# Patient Record
Sex: MALE | Race: WHITE | Employment: OTHER | URBAN - METROPOLITAN AREA
[De-identification: names, ages, dates, MRNs, and addresses within clinical notes are randomized per-mention and may not be internally consistent; named-entity substitution may affect disease eponyms.]

---

## 2019-04-25 ENCOUNTER — HOSPITAL ENCOUNTER (OUTPATIENT)
Age: 54
Discharge: HOME | End: 2019-04-25
Payer: COMMERCIAL

## 2019-04-25 ENCOUNTER — OFFICE VISIT (OUTPATIENT)
Dept: GASTROENTEROLOGY | Age: 54
End: 2019-04-25
Payer: COMMERCIAL

## 2019-04-25 VITALS
SYSTOLIC BLOOD PRESSURE: 136 MMHG | HEIGHT: 64 IN | WEIGHT: 149 LBS | DIASTOLIC BLOOD PRESSURE: 84 MMHG | BODY MASS INDEX: 25.44 KG/M2

## 2019-04-25 DIAGNOSIS — R10.13 EPIGASTRIC PAIN: Primary | ICD-10-CM

## 2019-04-25 DIAGNOSIS — K92.1 BLOOD IN STOOL: ICD-10-CM

## 2019-04-25 DIAGNOSIS — Z80.0 FAMILY HISTORY OF GI TRACT CANCER: ICD-10-CM

## 2019-04-25 DIAGNOSIS — R13.19 ESOPHAGEAL DYSPHAGIA: ICD-10-CM

## 2019-04-25 DIAGNOSIS — R10.13 EPIGASTRIC PAIN: ICD-10-CM

## 2019-04-25 PROCEDURE — 3017F COLORECTAL CA SCREEN DOC REV: CPT | Performed by: INTERNAL MEDICINE

## 2019-04-25 PROCEDURE — 36415 COLL VENOUS BLD VENIPUNCTURE: CPT

## 2019-04-25 PROCEDURE — G8419 CALC BMI OUT NRM PARAM NOF/U: HCPCS | Performed by: INTERNAL MEDICINE

## 2019-04-25 PROCEDURE — 83690 ASSAY OF LIPASE: CPT

## 2019-04-25 PROCEDURE — 99204 OFFICE O/P NEW MOD 45 MIN: CPT | Performed by: INTERNAL MEDICINE

## 2019-04-25 PROCEDURE — 1036F TOBACCO NON-USER: CPT | Performed by: INTERNAL MEDICINE

## 2019-04-25 PROCEDURE — 85025 COMPLETE CBC W/AUTO DIFF WBC: CPT

## 2019-04-25 PROCEDURE — 80053 COMPREHEN METABOLIC PANEL: CPT

## 2019-04-25 PROCEDURE — G8427 DOCREV CUR MEDS BY ELIG CLIN: HCPCS | Performed by: INTERNAL MEDICINE

## 2019-04-25 RX ORDER — LOSARTAN POTASSIUM 25 MG/1
TABLET ORAL
Refills: 5 | COMMUNITY
Start: 2019-03-11

## 2019-04-25 RX ORDER — MONTELUKAST SODIUM 10 MG/1
10 TABLET ORAL NIGHTLY
COMMUNITY

## 2019-04-25 RX ORDER — TESTOSTERONE CYPIONATE 200 MG/ML
INJECTION INTRAMUSCULAR
Refills: 5 | COMMUNITY
Start: 2019-03-11

## 2019-04-25 NOTE — LETTER
[address] [facility]  [facility] [address]                                       p [phone]  f [phone]  Dyana Uribe MD                        [address] 19    3:09 PM    Facility: [facility] ENDO                                                             Procedure Date & Time: 19  @9:00am     Pt arrival: 8:00am                                                                                    Patient Name:  Shaq Shaw     :  1965 PCP:  [address] [place] [address] Ph:    [phone] (home)           SSN:                                         PROCEDURE: EGD                                                                 DIAGNOSIS: Epigastric pain                                              R10.13                        Esophageal dysphagia                                 R13.10     Anesthesia: _None_  Time Needed: 20 minutes  Pt Position:  lateral, right side up         Outpatient _X_                                    ____PREP: none                            _____Cardiac Clearance by; ___________     Medications to be stopped 5 days before procedure: _________  Additional / Special Orders:                                                                                                                                                                                                     Shaq Shaw    1965                                                    Endoscopy Order   IN ACCORDANCE WITH OUR FORMULARY SYSTEM, A GENERIC EQUIVALENT DRUG MAY BE DISPNSED AND ADMINISTERED UNLESS D. A. W. IS WRITTEN WITH THE MEDICATION ORDER.   DATE HOUR PHYSICIAN:  RECORD DATE, HOUR AND SIGN EACH ENTRY   19 9:00am  1)  Admit for:   []Colonoscopy  [x]EGD     []Anesthesia/MAC        []ERCP     []Upper EUS     []Lower EUS                             2)  Diagnosis: Epigastric pain & esophageal dysphagia      3)  Establish IV access Solution:  []0.9 Normal Saline   [x]Lactated Ringers    []Other:                            Rate:   [x]KVO      []Other:     4)  Check blood sugar on all diabetic patients       Urine Pregnancy test on all menstruating females     5)  Labs:       []PT/INR         []Platelet       []Other:____________     6)  Procedure Medications:     []Fentanyl ______micrograms IV   []Demerol ________mg IV     []Versed _______mg IV                          []Other:     7) [x]Dinemap      [x]Pulse Oximetry    [x]Cardiac Monitor     8)  Post Procedure:       [x]Titrate O2 to keep O2 Sat.  > 90%     [x]Discharge instructions per                                                                     Endoscopy Protocol     [x]Discharge home when awake and vital signs stable                                                                          Signature:         Date:4/25/19               Time: 3:09 PM   PHYSICIANS ORDERS

## 2019-04-25 NOTE — PROGRESS NOTES
[address] [address] [facility] ,  [address] [facility]  Phone: [phone] [address] COMPLAINT     Chief Complaint   Patient presents with   [facility].     NP- abd pain, fm hx stomach cancer       HPI     Thank you Lolis Fang for asking me to see Irvin Grayson in consultation. He is a  [2] 47 y.o. . male seen independently who presents with the following GI complaints:  . Kandice Dawn  Complains of abdominal burning of a months duration. Indicates his mother had intestinal cancer. Saw some dark black stool at onset but none recently. No heartburn but has had some recent dysphagia. No cough, chest pain, globus, regurgitation, nausea, or vomiting. No asa or nsaid use. Had colonoscopy last year in [place] which he states was normal.    HPI elements: location, severity, timing, modifying factors, quality, duration, context and associated signs/symptoms. Last Encounter Reviewed:  Pertinent PMH, FH, SH is reviewed below. Last EGD: none  Last Colonoscopy: 2018 normal    Review of available records reveals: Wt Readings from Last 50 Encounters:   04/25/19 149 lb (67.6 kg)       No components found for: HGBA1C  BP Readings from Last 3 Encounters:   04/25/19 136/84     Health Maintenance   Topic Date Due    Potassium monitoring  1965    Creatinine monitoring  1965    HIV screen  05/18/1981    DTaP/Tdap/Td vaccine (1 - Tdap) 05/18/1985    Lipid screen  05/18/2006    Diabetes screen  05/18/2006    Shingles Vaccine (1 of 2) 05/18/2016    Colon cancer screen colonoscopy  05/18/2016    Flu vaccine (Season Ended) 09/01/2019    Pneumococcal 0-64 years Vaccine  Aged Out       No components found for: [facility]   History reviewed. No pertinent past medical history. FAMILY HISTORY   History reviewed. No pertinent family history.   SOCIAL HISTORY     Social History     Socioeconomic History    Marital status:  Spouse name: Not on file    Number of children: Not on file    Years of education: Not on file    Highest education level: Not on file   Occupational History    Not on file   Social Needs    Financial resource strain: Not on file    Food insecurity:     Worry: Not on file     Inability: Not on file    Transportation needs:     Medical: Not on file     Non-medical: Not on file   Tobacco Use    Smoking status: Never Smoker    Smokeless tobacco: Never Used   Substance and Sexual Activity    Alcohol use: Not on file    Drug use: Not on file    Sexual activity: Not on file   Lifestyle    Physical activity:     Days per week: Not on file     Minutes per session: Not on file    Stress: Not on file   Relationships    Social connections:     Talks on phone: Not on file     Gets together: Not on file     Attends Zoroastrian service: Not on file     Active member of club or organization: Not on file     Attends meetings of clubs or organizations: Not on file     Relationship status: Not on file    Intimate partner violence:     Fear of current or ex partner: Not on file     Emotionally abused: Not on file     Physically abused: Not on file     Forced sexual activity: Not on file   Other Topics Concern    Not on file   Social History Narrative    Not on file     SURGICAL HISTORY   History reviewed. No pertinent surgical history.   CURRENT MEDICATIONS   (This list may include medications prescribed during this encounter as epic can not insert only the list prior to this encounter.)  Current Outpatient Rx   Medication Sig Dispense Refill    losartan (COZAAR) 25 MG tablet TAKE 1 TABLET BY MOUTH ONCE A DAY  5    montelukast (SINGULAIR) 10 MG tablet Take 10 mg by mouth nightly      testosterone cypionate (DEPOTESTOTERONE CYPIONATE) 200 MG/ML injection INJECT  5ML 1ML EVERY 2 WEEKS  5     ALLERGIES     Allergies   Allergen Reactions    Pcn [Penicillins]      IMMUNIZATIONS     There is no immunization history on file for this patient. REVIEW OF SYSTEMS   See HPI for further details and pertinent postiives. Negative for the following:  Constitutional: Negative for weight change. Negative for appetite change and fatigue. HENT: Negative for nosebleeds, sore throat, mouth sores, and voice change. Respiratory: Negative for cough, choking and chest tightness. Cardiovascular: Negative for chest pain   Gastrointestinal: See HPI  Musculoskeletal: Negative for arthralgias. Skin: Negative for pallor. Neurological: Negative for weakness and light-headedness. Hematological: Negative for adenopathy. Does not bruise/bleed easily. Psychiatric/Behavioral: Negative for suicidal ideas. PHYSICAL EXAM   VITAL SIGNS: /84 (Site: Left Upper Arm)   Ht 5' 4\" (1.626 m)   Wt 149 lb (67.6 kg)   BMI 25.58 kg/m²   Wt Readings from Last 3 Encounters:   04/25/19 149 lb (67.6 kg)     Constitutional: Well developed, Well nourished, No acute distress, Non-toxic appearance. HENT: Normocephalic, Atraumatic, Bilateral external ears normal, Oropharynx moist, No oral exudates, Nose normal.   Eyes: Conjunctiva normal, No discharge. Neck: Normal range of motion, No tenderness, Supple, No stridor. Lymphatic: No cervical, subclavian, or axillary lymphadenopathy. Cardiovascular: Normal heart rate, Normal rhythm, No murmurs, No rubs, No gallops. Thorax & Lungs: Normal breath sounds, No respiratory distress, No wheezing, No chest tenderness. No gynecomastia. Abdomen: scars consistent with stated surgeries, no hernias, no HSM, soft NTND   Rectal:  Deferred. Skin: Warm, Dry, No erythema, No rash. No bruising. No spider hemangiomas. Back: No tenderness, No CVA tenderness. Lower Extremities: Intact distal pulses, No edema, No tenderness, No cyanosis, No clubbing. Neurologic: Alert & oriented x 3, Normal motor function, Normal sensory function, No focal deficits noted. No asterixis.   RADIOLOGY/PROCEDURES       FINAL IMPRESSION Orders Placed This Encounter   Procedures    CBC Auto Differential     Standing Status:   Future     Number of Occurrences:   1     Standing Expiration Date:   4/25/2020    Comprehensive Metabolic Panel     Standing Status:   Future     Number of Occurrences:   1     Standing Expiration Date:   4/25/2020    Lipase     Standing Status:   Future     Number of Occurrences:   1     Standing Expiration Date:   4/25/2020    EGD     Scheduling Instructions:      Please provide prep of choice instructions and prescription. General guidelines for holding blood thinners/anticoagulants around endoscopic procedure are but patients are encouraged to check with their prescribing physician. The patient may hold Plavix, Effient, Brilinta 5 days prior to the procedure unless:       A drug eluting stent has been placed within past 12 months. A nondrug eluting stent has been placed within past 1 month. Coumadin may be held 4 days prior to the procedure unless:        Mechanical mitral valve replacement (requires heparin bridge while Coumadin held and is managed by pharmacy)      Pradaxa, Xarelto, Eliquis may be held 2-3 days prior to procedure. According to pharmacokinetics of the drug, package insert, cardiology practice patterns, and T1/2 of theses drugs (12 hrs), Eliquis and Xarelto are held 48hrs prior to any procedure, including major surgical procedures w/o       increased bleeding.  That is usually the standard of care, as coagulation would/should be normalized at 48hrs. Every attempt should be made to maintain ASA 81mg per day throughout the lory-operative period in patients with diagnosis of ASHD. These recommendations may need to be modified by the provider/ based on risk /benefit analysis of the procedure and the patients history.             If anticoagulation can not be held because recent cardiac stent, elective endoscopic procedures should be delayed until

## 2019-04-25 NOTE — PATIENT INSTRUCTIONS
[facility]    [address] [facility] ,  [address] [facility]  Phone: [phone]  [facility],  [address] [place] [address], [address] [place] [address]  Phone: [phone]    Sedation  Three types of sedation are used for endoscopy and colonoscopy. The standard and most common is called conscious sedation. This is administered by the gastroenterologist and is part of the standard procedure. Common medications used for this are IV forms of a benzodiazepine (most commonly Versed) and a narcotic (most commonly fentanyl). Benadryl and nausea medicines may also be used. The effect of this is to make you comfortable. Most people will actually have amnesia with this and not recall the procedure. Some individuals will have other types of anesthesia provided by an anesthesiologist or nurse anesthetist.  The reason for this is a history of poor sedation, medication use that makes one more resistant to conscious sedation, a medical condition for which conscious sedation is contraindicated or other medical unstable conditions. This usually involves a separate fee from anesthesia. The most common of these is propofol (diprivan sedation) which is a deeper sedative the conscious sedation. In some instances, general anesthesia with intubation (breathing tube) is required. ENDOSCOPY OVERVIEW  An upper endoscopy, often referred to as endoscopy, EGD, or wethstgv-zyyqbw-kuyqomuhrsis, is a procedure that allows a physician to directly examine the upper part of the gastrointestinal (GI) tract, which includes the esophagus (swallowing tube), the stomach, and the duodenum (the first section of the small intestine)  The physician who performs the procedures, known as an endoscopist, has special training in using an endoscope to examine the upper GI system, looking for inflammation (redness, irritation), bleeding, ulcers, or tumors.     REASONS FOR UPPER ENDOSCOPY  The most common reasons for upper endoscopy include:  Unexplained discomfort in the upper abdomen   GERD or gastroesophageal reflux disease, (often called heartburn)   Persistent nausea and vomiting   Upper GI bleeding (vomiting blood or blood found in the stool that originated from the upper part of the gastrointestinal tract). Bleeding can be treated during the endoscopy. Difficulty swallowing; food/liquids getting stuck in the esophagus during swallowing. This may be caused by a narrowing (stricture) or tumor. The stricture may be dilated with special balloons or dilation tubes during the endoscopy. Abnormal or unclear findings on an upper GI x-ray, CT scan or MRI. Removal of a foreign body (a swallowed object). To check healing or progress on previously found polyps (growths), tumors, or ulcers. ENDOSCOPY PREPARATION  You will be given specific instructions regarding how to prepare for the examination before the procedure. These instructions are designed to maximize your safety during and after the examination and to minimize possible complications. It is important to read the instructions ahead of time and follow them carefully. Do not hesitate to call the physician's office or the endoscopy unit if there are questions. Nothing to eat after midnight the day before the test. You may be asked not to eat or drink anything for up to eight hours before the test. It is important for your stomach to be empty to allow the endoscopist to visualize the entire area and to decrease the possibility of food or fluid being vomited into the lungs while under sedation (called aspiration). You may be asked to adjust the dose of your medications or to stop specific medications (such as aspirin-like drugs) temporarily before the examination. You should discuss your medications with your physician before your appointment for the endoscopy. You should arrange for a friend or family member to escort you home after the examination. Although you will be awake by the time you are discharged, the medications used for sedation cause temporary changes in the reflexes and judgment and interfere with your ability to drive or make decisions (similar to the effects of alcohol). WHAT TO EXPECT DURING ENDOSCOPY  Prior to the endoscopy, the staff will review your medical and surgical history, including current medications. A physician will explain the procedure and ask you to sign a consent. Before signing the consent, you should understand all the benefits and risks of the procedure, and should have all of your questions answered. An intravenous line (a needle inserted into a vein in the hand or arm) will be started to deliver medications. You will be given a combination of a sedative (to help you relax), and a narcotic (to prevent discomfort). Although most patients are sedated for the examination, many tolerate the procedure well without any medication. Your vital signs (blood pressure, heart rate, and blood oxygen level) will be monitored before, during, and after the examination. The monitoring is not painful. Oxygen is often given during the procedure through a small tube that sits under the nose and is fitted around the ears. For safety reasons, dentures should be removed before the procedure. THE ENDOSCOPY PROCEDURE  The procedure typically takes between 10 and 20 minutes to complete. The endoscopy is performed while you lie on your left side. Sometimes the physician will give a medication to numb the throat (either a gargle or a spray). A plastic mouth guard is placed between the teeth to prevent damage to the teeth and scope. The endoscope (also called a gastroscope) is a flexible tube that is about the size of a finger. The scope has a lens and a light source that allows the endoscopist to look into the scope to see the inner lining of the upper gastrointestinal tract, or to view it on a TV monitor.  Most people have no difficulty swallowing the flexible gastroscope as a result of the sedating medications. Many people sleep during the test; others are very relaxed and generally not aware of the examination. An alternative procedure called transnasal endoscopy may be available in some facilities. This involves passing a very thin scope (about the size of a drinking straw) through the nose. You are not sedated but a medication is applied to the nose to prevent discomfort. A full examination can be performed with this instrument. The endoscopist may take tissue samples called biopsies (not painful), or perform specific treatments (such as dilation, removal of polyps, treatment of bleeding), depending upon what is found during the examination. Air is introduced through the scope to open the esophagus, stomach, and intestine, allowing the scope to be passed through these structures and improving the endoscopist's ability to see all of the structures. You may experience a mild discomfort as air is pushed into the intestinal tract. This is not harmful and belching may relieve the sensation. The endoscope does not interfere with breathing. Taking slow, deep breaths during the procedure may help you to relax. ENDOSCOPY RECOVERY  After the endoscopy, you will be observed for one to two hours while the sedative medication wears off. The medicines cause most people to temporarily feel tired or have difficulty concentrating and you should not drive or return to work after the procedure. The most common discomfort after the examination is a feeling of bloating as a result of the air introduced during the examination. This usually resolves quickly. Some patients also have a mild sore throat. Most patients are able to eat shortly after the examination. ENDOSCOPY COMPLICATIONS  Upper endoscopy is a safe procedure and complications are uncommon.  The following is a list of possible complications:  Aspiration (inhaling) of food or fluids into the lungs, the risk of which can be minimized by not eating or drinking for the recommended period of time before the examination. The endoscope can cause a tear or hole in the tissue being examined. This is a serious complication but fortunately occurs only rarely. Bleeding can occur from biopsies or the removal of polyps, although it is usually minimal and stops quickly on its own or can be easily controlled. Reactions to the sedative medications are possible; the endoscopy team (doctors and nurses) will ask about previous medication allergies or reactions and about health problems such as heart, lung, kidney, or liver disease. Providing this information to the team ensures a safer examination. The medications may produce irritation in the vein at the site of the intravenous line. If redness, swelling, or discomfort occurs, your should call your endoscopist or primary care provider, or the number given by the nurse at discharge. The following signs and symptoms should be reported immediately:  Severe abdominal pain (more than gas cramps)   A firm, distended abdomen   Vomiting   Any temperature elevation   Difficulty swallowing or severe throat pain   A crunching feeling under the skin of the neck    AFTER UPPER ENDOSCOPY  Most patients tolerate endoscopy very well and feel fine afterwards. Some fatigue is common after the examination, and you should plan to take it easy and relax the rest of the day. The endoscopist can describe the result of their examination before you leave the endoscopy unit. If biopsies have been taken or polyps removed, you should call for results within one to two weeks. WHERE TO GET MORE INFORMATION  Your healthcare provider is the best source of information for questions and concerns related to your medical problem. The following organizations also provide reliable health information. [employer] (www.nlm.nih.gov/medlineplus/healthtopics. html)  The [address] of Gastrointestinal Endoscopy: (www.askasge. org)  [employer] of Diabetes and Digestive and Kidney Diseases (http://digestive. niddk.nih.gov/ddiseases/pubs/upperendoscopy/index. htm)

## 2019-04-26 ENCOUNTER — TELEPHONE (OUTPATIENT)
Dept: GASTROENTEROLOGY | Age: 54
End: 2019-04-26

## 2019-04-26 DIAGNOSIS — R73.09 ELEVATED GLUCOSE LEVEL: Primary | ICD-10-CM

## 2019-04-26 LAB
A/G RATIO: 1.6 (ref 1.1–2.2)
ALBUMIN SERPL-MCNC: 4.2 G/DL (ref 3.4–5)
ALP BLD-CCNC: 97 U/L (ref 40–129)
ALT SERPL-CCNC: 25 U/L (ref 10–40)
ANION GAP SERPL CALCULATED.3IONS-SCNC: 13 MMOL/L (ref 3–16)
AST SERPL-CCNC: 19 U/L (ref 15–37)
BASOPHILS ABSOLUTE: 0.1 K/UL (ref 0–0.2)
BASOPHILS RELATIVE PERCENT: 1.1 %
BILIRUB SERPL-MCNC: 0.3 MG/DL (ref 0–1)
BUN BLDV-MCNC: 15 MG/DL (ref 7–20)
CALCIUM SERPL-MCNC: 9.1 MG/DL (ref 8.3–10.6)
CHLORIDE BLD-SCNC: 104 MMOL/L (ref 99–110)
CO2: 25 MMOL/L (ref 21–32)
CREAT SERPL-MCNC: 0.7 MG/DL (ref 0.9–1.3)
EOSINOPHILS ABSOLUTE: 0.3 K/UL (ref 0–0.6)
EOSINOPHILS RELATIVE PERCENT: 3.3 %
GFR AFRICAN AMERICAN: >60
GFR NON-AFRICAN AMERICAN: >60
GLOBULIN: 2.6 G/DL
GLUCOSE BLD-MCNC: 142 MG/DL (ref 70–99)
HCT VFR BLD CALC: 43.7 % (ref 40.5–52.5)
HEMOGLOBIN: 15.2 G/DL (ref 13.5–17.5)
LIPASE: 42 U/L (ref 13–60)
LYMPHOCYTES ABSOLUTE: 1.5 K/UL (ref 1–5.1)
LYMPHOCYTES RELATIVE PERCENT: 20.4 %
MCH RBC QN AUTO: 29.4 PG (ref 26–34)
MCHC RBC AUTO-ENTMCNC: 34.8 G/DL (ref 31–36)
MCV RBC AUTO: 84.5 FL (ref 80–100)
MONOCYTES ABSOLUTE: 0.5 K/UL (ref 0–1.3)
MONOCYTES RELATIVE PERCENT: 7 %
NEUTROPHILS ABSOLUTE: 5.2 K/UL (ref 1.7–7.7)
NEUTROPHILS RELATIVE PERCENT: 68.2 %
PDW BLD-RTO: 13.9 % (ref 12.4–15.4)
PLATELET # BLD: 191 K/UL (ref 135–450)
PMV BLD AUTO: 10.8 FL (ref 5–10.5)
POTASSIUM SERPL-SCNC: 3.8 MMOL/L (ref 3.5–5.1)
RBC # BLD: 5.17 M/UL (ref 4.2–5.9)
SODIUM BLD-SCNC: 142 MMOL/L (ref 136–145)
TOTAL PROTEIN: 6.8 G/DL (ref 6.4–8.2)
WBC # BLD: 7.6 K/UL (ref 4–11)

## 2019-04-26 NOTE — RESULT ENCOUNTER NOTE
Please call patient with results. Remind about ability to get results, make appointments, and communicate with us through [facility] since not signed up. Will need to follow up with a fasting sugar at some point to make sure he does not have diabetes.

## 2019-04-26 NOTE — TELEPHONE ENCOUNTER
----- Message from Catherine Lanier MD sent at 4/26/2019 11:35 AM EDT -----  Please call patient with results. Remind about ability to get results, make appointments, and communicate with us through [employer] since not signed up. Will need to follow up with a fasting sugar at some point to make sure he does not have diabetes.

## 2019-05-22 ENCOUNTER — TELEPHONE (OUTPATIENT)
Dept: GASTROENTEROLOGY | Age: 54
End: 2019-05-22

## 2019-05-23 ENCOUNTER — HOSPITAL ENCOUNTER (OUTPATIENT)
Age: 54
Setting detail: OUTPATIENT SURGERY
Discharge: HOME | End: 2019-05-23
Attending: INTERNAL MEDICINE | Admitting: INTERNAL MEDICINE
Payer: COMMERCIAL

## 2019-05-23 ENCOUNTER — HOSPITAL ENCOUNTER (OUTPATIENT)
Age: 54
Discharge: HOME | End: 2019-05-23
Payer: COMMERCIAL

## 2019-05-23 VITALS
SYSTOLIC BLOOD PRESSURE: 137 MMHG | TEMPERATURE: 96.7 F | WEIGHT: 145 LBS | OXYGEN SATURATION: 96 % | RESPIRATION RATE: 16 BRPM | HEART RATE: 63 BPM | BODY MASS INDEX: 24.16 KG/M2 | HEIGHT: 65 IN | DIASTOLIC BLOOD PRESSURE: 89 MMHG

## 2019-05-23 DIAGNOSIS — R73.09 ELEVATED GLUCOSE LEVEL: ICD-10-CM

## 2019-05-23 LAB
A/G RATIO: 1.8 (ref 1.1–2.2)
ALBUMIN SERPL-MCNC: 4.5 G/DL (ref 3.4–5)
ALP BLD-CCNC: 109 U/L (ref 40–129)
ALT SERPL-CCNC: 20 U/L (ref 10–40)
ANION GAP SERPL CALCULATED.3IONS-SCNC: 13 MMOL/L (ref 3–16)
AST SERPL-CCNC: 16 U/L (ref 15–37)
BILIRUB SERPL-MCNC: 0.6 MG/DL (ref 0–1)
BUN BLDV-MCNC: 12 MG/DL (ref 7–20)
CALCIUM SERPL-MCNC: 9.2 MG/DL (ref 8.3–10.6)
CHLORIDE BLD-SCNC: 103 MMOL/L (ref 99–110)
CO2: 24 MMOL/L (ref 21–32)
CREAT SERPL-MCNC: 0.9 MG/DL (ref 0.9–1.3)
GFR AFRICAN AMERICAN: >60
GFR NON-AFRICAN AMERICAN: >60
GLOBULIN: 2.5 G/DL
GLUCOSE BLD-MCNC: 109 MG/DL (ref 70–99)
POTASSIUM SERPL-SCNC: 4.1 MMOL/L (ref 3.5–5.1)
SODIUM BLD-SCNC: 140 MMOL/L (ref 136–145)
TOTAL PROTEIN: 7 G/DL (ref 6.4–8.2)

## 2019-05-23 PROCEDURE — 99152 MOD SED SAME PHYS/QHP 5/>YRS: CPT | Performed by: INTERNAL MEDICINE

## 2019-05-23 PROCEDURE — 6360000002 HC RX W HCPCS: Performed by: INTERNAL MEDICINE

## 2019-05-23 PROCEDURE — 80053 COMPREHEN METABOLIC PANEL: CPT

## 2019-05-23 PROCEDURE — 7100000011 HC PHASE II RECOVERY - ADDTL 15 MIN: Performed by: INTERNAL MEDICINE

## 2019-05-23 PROCEDURE — 7100000010 HC PHASE II RECOVERY - FIRST 15 MIN: Performed by: INTERNAL MEDICINE

## 2019-05-23 PROCEDURE — 3609012400 HC EGD TRANSORAL BIOPSY SINGLE/MULTIPLE: Performed by: INTERNAL MEDICINE

## 2019-05-23 PROCEDURE — 43239 EGD BIOPSY SINGLE/MULTIPLE: CPT | Performed by: INTERNAL MEDICINE

## 2019-05-23 PROCEDURE — 88305 TISSUE EXAM BY PATHOLOGIST: CPT

## 2019-05-23 PROCEDURE — 36415 COLL VENOUS BLD VENIPUNCTURE: CPT

## 2019-05-23 PROCEDURE — 3609015300 HC ESOPHAGEAL DILATION MALONEY: Performed by: INTERNAL MEDICINE

## 2019-05-23 PROCEDURE — 2709999900 HC NON-CHARGEABLE SUPPLY: Performed by: INTERNAL MEDICINE

## 2019-05-23 PROCEDURE — 43450 DILATE ESOPHAGUS 1/MULT PASS: CPT | Performed by: INTERNAL MEDICINE

## 2019-05-23 RX ORDER — MIDAZOLAM HYDROCHLORIDE 5 MG/ML
INJECTION INTRAMUSCULAR; INTRAVENOUS PRN
Status: DISCONTINUED | OUTPATIENT
Start: 2019-05-23 | End: 2019-05-23 | Stop reason: ALTCHOICE

## 2019-05-23 RX ORDER — SODIUM CHLORIDE, SODIUM LACTATE, POTASSIUM CHLORIDE, CALCIUM CHLORIDE 600; 310; 30; 20 MG/100ML; MG/100ML; MG/100ML; MG/100ML
INJECTION, SOLUTION INTRAVENOUS CONTINUOUS
Status: DISCONTINUED | OUTPATIENT
Start: 2019-05-23 | End: 2019-05-23 | Stop reason: HOSPADM

## 2019-05-23 RX ORDER — OMEPRAZOLE 20 MG/1
20 CAPSULE, DELAYED RELEASE ORAL 2 TIMES DAILY
Qty: 30 CAPSULE | Refills: 1 | Status: SHIPPED | OUTPATIENT
Start: 2019-05-23

## 2019-05-23 RX ORDER — FENTANYL CITRATE 50 UG/ML
INJECTION, SOLUTION INTRAMUSCULAR; INTRAVENOUS PRN
Status: DISCONTINUED | OUTPATIENT
Start: 2019-05-23 | End: 2019-05-23 | Stop reason: ALTCHOICE

## 2019-05-23 ASSESSMENT — PAIN - FUNCTIONAL ASSESSMENT: PAIN_FUNCTIONAL_ASSESSMENT: 0-10

## 2019-05-23 NOTE — H&P
[address] [address]     [address] [place] [address] ,  [facility], [facility]  Phone: [phone]     CHIEF COMPLAINT           Chief Complaint   Patient presents with   [address] [place] [address]       NP- abd pain, fm hx stomach cancer         HPI      Thank you Verna Mccarty for asking me to see Era Gan in consultation. He is a  [2] 47 y.o. . male seen independently who presents with the following GI complaints:  . Kandice Dawn  Complains of abdominal burning of a months duration. Indicates his mother had intestinal cancer. Saw some dark black stool at onset but none recently. No heartburn but has had some recent dysphagia. No cough, chest pain, globus, regurgitation, nausea, or vomiting. No asa or nsaid use. Had colonoscopy last year in [place] which he states was normal.     HPI elements: location, severity, timing, modifying factors, quality, duration, context and associated signs/symptoms.     Last Encounter Reviewed:  Pertinent PMH, FH, SH is reviewed below. Last EGD: none  Last Colonoscopy: 2018 normal     Review of available records reveals: Wt Readings from Last 50 Encounters:   04/25/19 149 lb (67.6 kg)         No components found for: HGBA1C      BP Readings from Last 3 Encounters:   04/25/19 136/84           Health Maintenance   Topic Date Due    Potassium monitoring  1965    Creatinine monitoring  1965    HIV screen  05/18/1981    DTaP/Tdap/Td vaccine (1 - Tdap) 05/18/1985    Lipid screen  05/18/2006    Diabetes screen  05/18/2006    Shingles Vaccine (1 of 2) 05/18/2016    Colon cancer screen colonoscopy  05/18/2016    Flu vaccine (Season Ended) 09/01/2019    Pneumococcal 0-64 years Vaccine  Aged Out         No components found for: SURGICALPATH      PAST MEDICAL HISTORY   Past Medical History   History reviewed. No pertinent past medical history. FAMILY HISTORY   Family History   History reviewed. No pertinent family history.      SOCIAL HISTORY      Social History               Socioeconomic History    Marital status:        Spouse name: Not on file    Number of children: Not on file    Years of education: Not on file    Highest education level: Not on file   Occupational History    Not on file   Social Needs    Financial resource strain: Not on file    Food insecurity:       Worry: Not on file       Inability: Not on file    Transportation needs:       Medical: Not on file       Non-medical: Not on file   Tobacco Use    Smoking status: Never Smoker    Smokeless tobacco: Never Used   Substance and Sexual Activity    Alcohol use: Not on file    Drug use: Not on file    Sexual activity: Not on file   Lifestyle    Physical activity:       Days per week: Not on file       Minutes per session: Not on file    Stress: Not on file   Relationships    Social connections:       Talks on phone: Not on file       Gets together: Not on file       Attends Confucianist service: Not on file       Active member of club or organization: Not on file       Attends meetings of clubs or organizations: Not on file       Relationship status: Not on file    Intimate partner violence:       Fear of current or ex partner: Not on file       Emotionally abused: Not on file       Physically abused: Not on file       Forced sexual activity: Not on file   Other Topics Concern    Not on file   Social History Narrative    Not on file         SURGICAL HISTORY   Past Surgical History   History reviewed. No pertinent surgical history.      CURRENT MEDICATIONS   (This list may include medications prescribed during this encounter as epic can not insert only the list prior to this encounter.)  Current Outpatient Rx          Current Outpatient Rx   Medication Sig Dispense Refill    losartan (COZAAR) 25 MG tablet TAKE 1 TABLET BY MOUTH ONCE A DAY   5    montelukast (SINGULAIR) 10 MG tablet Take 10 mg by mouth nightly        testosterone cypionate ([facility] CYPIONATE) 200 MG/ML injection INJECT  5ML 1ML EVERY 2 WEEKS   5         ALLERGIES           Allergies   Allergen Reactions    Pcn [Penicillins]        IMMUNIZATIONS      There is no immunization history on file for this patient. REVIEW OF SYSTEMS   See HPI for further details and pertinent postiives. Negative for the following:  Constitutional: Negative for weight change. Negative for appetite change and fatigue. HENT: Negative for nosebleeds, sore throat, mouth sores, and voice change. Respiratory: Negative for cough, choking and chest tightness. Cardiovascular: Negative for chest pain   Gastrointestinal: See HPI  Musculoskeletal: Negative for arthralgias. Skin: Negative for pallor. Neurological: Negative for weakness and light-headedness. Hematological: Negative for adenopathy. Does not bruise/bleed easily. Psychiatric/Behavioral: Negative for suicidal ideas. PHYSICAL EXAM   VITAL SIGNS: /84 (Site: Left Upper Arm)   Ht 5' 4\" (1.626 m)   Wt 149 lb (67.6 kg)   BMI 25.58 kg/m²       Wt Readings from Last 3 Encounters:   04/25/19 149 lb (67.6 kg)      Constitutional: Well developed, Well nourished, No acute distress, Non-toxic appearance. HENT: Normocephalic, Atraumatic, Bilateral external ears normal, Oropharynx moist, No oral exudates, Nose normal.   Eyes: Conjunctiva normal, No discharge. Neck: Normal range of motion, No tenderness, Supple, No stridor. Lymphatic: No cervical, subclavian, or axillary lymphadenopathy. Cardiovascular: Normal heart rate, Normal rhythm, No murmurs, No rubs, No gallops. Thorax & Lungs: Normal breath sounds, No respiratory distress, No wheezing, No chest tenderness. No gynecomastia. Abdomen: scars consistent with stated surgeries, no hernias, no HSM, soft NTND   Rectal:  Deferred. Skin: Warm, Dry, No erythema, No rash. No bruising. No spider hemangiomas. Back: No tenderness, No CVA tenderness.    Lower Extremities: Intact distal pulses, No edema, No These recommendations may need to be modified by the provider/ based on risk /benefit analysis of the procedure and the patients history.                   If anticoagulation can not be held because recent cardiac stent, elective endoscopic procedures should be delayed until they have received the minimum duration of recommended antiplatlet therapy and it can safely be held. Again if unsure, patient should discuss with prescribing physician/service.                   If anticoagulation can not be stopped, endoscopic procedures can still be performed either diagnostically at a somewhat higher risk. Understand that any therapeutic procedure where anything beyond looking is performed, carries higher risks. For this reason without overt bleeding other testing          such as cologuard may be more appropriate.                     High risk endoscopic procedures that require stopping antiplatelet and anticoagulation therapy include polypectomy, biliary or pancreatic sphincterotomy, pneumatic or bougie dilation, PEG placement, therapeutic balloon-assisted enteroscopy, EUS and FNA, tumor ablation by any technique,          cystogastrostomy,and treatment of varices.       Order Specific Question:   Screening or Diagnostic?       Answer:   Rachel Graves was seen today for establish care.     Diagnoses and all orders for this visit:     Epigastric pain  -     CBC Auto Differential; Future  -     Comprehensive Metabolic Panel; Future  -     Lipase; Future  -     EGD     Blood in stool  -     CBC Auto Differential; Future  -     EGD     Family history of GI tract cancer  -     CBC Auto Differential; Future  -     Comprehensive Metabolic Panel; Future  -     EGD     Esophageal dysphagia  -     EGD        ORDERED FUTURE/PENDING TESTS         FOLLOWUP   Return for EGD.         Toni 40 5/23/19 10:05 AM

## 2019-05-23 NOTE — OP NOTE
[place] [address] ,  [address] [facility]  Phone: [phone] [facility] [address], [address] [address], [address] [place] [address]  Phone: [phone]   SUF:[phone]    EGD Procedure Note    Patient: Sandoval Garcia  : 1965    Procedure: Esophagogastroduodenoscopy with biopsy, esophageal dilation    Date:  2019     Endoscopist:  Cale Hernandez MD    Referring Physician:  Alvarado Ball    Preoperative Diagnosis:  EPIGASTRIC PAIN, DYSPHAGIA    Anesthesia: Anesthesia: Moderate Sedation  Sedation: Versed 5 mg IV, fentanyl 100 mcg IV  Start Time: 10:20  Stop Time: 10:30  ASA Class: 2  Mallampati: I (soft palate, uvula, fauces, tonsillar pillars visible)    Indications: This is a 49y.o. year old male who presents today with epigastric pain, esophageal dysphagia. Procedure Details  Informed consent was obtained for the procedure, including conscious sedation. Risks of pancreatitis, infection, perforation, hemorrhage, adverse drug reaction and aspiration were discussed. The patient was placed in the left lateral decubitus position. Based on the pre-procedure assessment, including review of the patient's medical history, medications, allergies, and review of systems, he had been deemed to be an appropriate candidate for conscious sedation; he was therefore sedated with the medications listed above. He was monitored continuously with ECG tracing, pulse oximetry, blood pressure monitoring, and direct observation. A gastroscope was inserted into the mouth and advanced under direct vision to second portion of the duodenum. A careful inspection was made as the gastroscope was withdrawn, including a retroflexed view of the proximal stomach including views of the incisura and cardia; findings and interventions are described below. Appropriate photodocumentation Was Obtained.   If photos taken, they were ordered to be scanned into the medical record. Findings: -normal esophagus, and duodenum  -erosions involving the antrum. Gastric biopsies taken following the 5-biopsy [place] [address] with all specimens placed in the same jar. -The esophagus was dilated with a 54F Marlow with moderate resistance and no heme. Specimens: Was Obtained    Complications:   None; patient tolerated the procedure well. Disposition:   PACU - hemodynamically stable. Estimated Blood loss:  none    Impression:   -See post-procedure diagnoses. Recommendations:  -prilosec prescribed  --Await biopsy result and treat with quadruple therapy for Helicobacter pylori if positive.   If positive need to do urea breath test or stool antigen to confirm eradication 1 month after completing antibiotics and when off ppi for 2 weeks.  -Follow up with me in 2 months        Toni Matt 5/23/19 10:37 AM

## 2019-05-28 NOTE — RESULT ENCOUNTER NOTE
Please call patient with normal results. Remind about ability to get results, make appointments, and communicate with us through [employer] since not signed up.

## 2019-05-28 NOTE — RESULT ENCOUNTER NOTE
Please call patient with results were negative for h pylori.    -prilosec prescribed  -Follow up with me in 2 months

## 2022-06-13 ENCOUNTER — OFFICE VISIT (OUTPATIENT)
Dept: ORTHOPEDIC SURGERY | Age: 57
End: 2022-06-13
Payer: COMMERCIAL

## 2022-06-13 VITALS — BODY MASS INDEX: 24.16 KG/M2 | HEIGHT: 65 IN | WEIGHT: 145 LBS

## 2022-06-13 DIAGNOSIS — M25.562 LEFT KNEE PAIN, UNSPECIFIED CHRONICITY: ICD-10-CM

## 2022-06-13 DIAGNOSIS — M10.9 GOUT, UNSPECIFIED CAUSE, UNSPECIFIED CHRONICITY, UNSPECIFIED SITE: Primary | ICD-10-CM

## 2022-06-13 DIAGNOSIS — M11.20 CHONDROCALCINOSIS: ICD-10-CM

## 2022-06-13 PROCEDURE — 99204 OFFICE O/P NEW MOD 45 MIN: CPT | Performed by: ORTHOPAEDIC SURGERY

## 2022-06-13 PROCEDURE — 20610 DRAIN/INJ JOINT/BURSA W/O US: CPT | Performed by: ORTHOPAEDIC SURGERY

## 2022-06-13 RX ORDER — INDOMETHACIN 25 MG/1
25 CAPSULE ORAL
Qty: 45 CAPSULE | Refills: 0 | Status: SHIPPED | OUTPATIENT
Start: 2022-06-13

## 2022-06-13 NOTE — PROGRESS NOTES
ORTHOPAEDIC SURGERY H&P / CONSULTATION NOTE    Chief complaint:   Chief Complaint   Patient presents with    Knee Pain     left knee pain      History of present illness: The patient is a 65 y.o. male with subjective symptoms of left knee pain. The chief complaint is located at left knee. Duration of symptoms has been for 1 week. The severity of symptoms is rated at 9/10 pain on intake form. Patient denies fevers or chills. He has been on Aleve and has gone to the emergency room on 6/12/2022 given swelling in the knee. He denies trauma. He states has had gout flares several times over his last several years. He states deep throbbing aching pain. He states swelling does cause the knee to be bent and difficult ambulating. He denies any recent infection or viral prodrome. He has not done any therapy and he has not had any injections thus far. He states his primary care doctor when he had seen him previously had recommended potential starting medication however he had not done so for treatment of gout    The patient has tried the below listed items prior to today's consultation for above listed chief complaint.     +   Over-the-counter anti-inflammatories/prescription medication anti-inflammatory. -   Physical therapy / guided home exercise program -     -   Previous corticosteroid injections    Past medical history:    Past Medical History:   Diagnosis Date    Hypertension         Past surgical history:    Past Surgical History:   Procedure Laterality Date    COLONOSCOPY  2017    normal    ESOPHAGEAL DILATATION N/A 5/23/2019    ESOPHAGEAL DILATION BACON performed by Jarrett Springer MD at [place] [address] ENDOSCOPY  5/23/2019    EGD BIOPSY performed by Jarrett Springer MD at [facility] 8:     Allergies   Allergen Reactions    Pcn [Penicillins]          Medications:   Current Outpatient Medications:     indomethacin (INDOCIN) 25 MG capsule, Take 1 capsule by mouth 3 times daily (with meals), Disp: 45 capsule, Rfl: 0    omeprazole (PRILOSEC) 20 MG delayed release capsule, Take 1 capsule by mouth 2 times daily, Disp: 30 capsule, Rfl: 1    losartan (COZAAR) 25 MG tablet, TAKE 1 TABLET BY MOUTH ONCE A DAY, Disp: , Rfl: 5    montelukast (SINGULAIR) 10 MG tablet, Take 10 mg by mouth nightly, Disp: , Rfl:     testosterone cypionate (DEPOTESTOTERONE CYPIONATE) 200 MG/ML injection, INJECT  5ML 1ML EVERY 2 WEEKS, Disp: , Rfl: 5     Social history: Denies IV drug use. Social History     Socioeconomic History    Marital status:      Spouse name: Not on file    Number of children: Not on file    Years of education: Not on file    Highest education level: Not on file   Occupational History    Not on file   Tobacco Use    Smoking status: Never Smoker    Smokeless tobacco: Never Used   Vaping Use    Vaping Use: Never used   Substance and Sexual Activity    Alcohol use: Not Currently    Drug use: Never    Sexual activity: Not on file   Other Topics Concern    Not on file   Social History Narrative    Not on file     Social Determinants of Health     Financial Resource Strain:     Difficulty of Paying Living Expenses: Not on file   Food Insecurity:     Worried About Running Out of Food in the Last Year: Not on file    Maria M of Food in the Last Year: Not on file   Transportation Needs:     Lack of Transportation (Medical): Not on file    Lack of Transportation (Non-Medical):  Not on file   Physical Activity:     Days of Exercise per Week: Not on file    Minutes of Exercise per Session: Not on file   Stress:     Feeling of Stress : Not on file   Social Connections:     Frequency of Communication with Friends and Family: Not on file    Frequency of Social Gatherings with Friends and Family: Not on file    Attends [employer] Services: Not on file    Active Member of Clubs or Organizations: Not on file   Ban Bangura Attends Club or Organization Meetings: Not on file    Marital Status: Not on file   Intimate Partner Violence:     Fear of Current or Ex-Partner: Not on file    Emotionally Abused: Not on file    Physically Abused: Not on file    Sexually Abused: Not on file   Housing Stability:     Unable to Pay for Housing in the Last Year: Not on file    Number of [place] in the Last Year: Not on file    Unstable Housing in the Last Year: Not on file     Tobacco use. Social History     Tobacco Use   Smoking Status Never Smoker   Smokeless Tobacco Never Used     Employment: Noncontributory    Workers compensation claim: Noncontributory    Review of systems: Patient denies any fevers chills chest pain shortness of breath nausea vomiting significant weight loss any change in voiding or bowel movements. Patient denies any significant numbness or tingling at baseline as it relates to this presenting symptom/chief complaint. The patient denies any significant problems with skin or any significant allergies. Physical examination:  Body mass index is 24.13 kg/m². AAOx3, NCAT  EOMI  MMM  RR  Unlabored breathing, no wheezing  Skin intact BUE and BLE, warm and moist  Bilateral lower extremity examination specific to subjective symptoms  Exam Left Lower Extremity: No erythema albeit trace warmth  2-3+ effusion,      5/55/0 limited secondary to swelling active ROM (E/F/Lag), same P assive ROM (E/F/Lag), unable to test anterior Drawer, 1A Lachman, unable to test posterior Drawer,   Stable varus/valgus at 0 and 30?,    trace medial/lateral TTP Joint Line, unable to test Margret,   Skin intact throughout  5/5 IP Q H TA G EHL  SILT DP SP LP MP S S  +2 DP pulse    Diagnostic imaging:  MY READ:  4 views left knee 6/13/2022: Positive chondrocalcinosis lateral greater than medial meniscus. Very slight medial joint space narrowing. Negative fracture    Pertinent lab work: None     Diagnosis Orders   1.  Gout, unspecified cause, unspecified chronicity, unspecified site  indomethacin (INDOCIN) 25 MG capsule    Christie Ramos MD, Rheumatology, [facility]    VA ARTHROCENTESIS ASPIR&/INJ MAJOR JT/BURSA W/O US   2. Chondrocalcinosis  indomethacin (INDOCIN) 25 MG capsule    Christie Ramos MD, Rheumatology, [facility]    VA ARTHROCENTESIS ASPIR&/INJ MAJOR JT/BURSA W/O US   3. Left knee pain, unspecified chronicity  XR KNEE LEFT (MIN 4 VIEWS)    VA ARTHROCENTESIS ASPIR&/INJ MAJOR JT/BURSA W/O US       Assessment and plan: 65 y.o. male with current subjective symptoms and physical exam findings with diagnostic imaging correlating to left knee chondrocalcinosis/crystalline arthropathy.  -Time of 16 minutes was spent coordinating and discussing the clinical findings, reviewing diagnostic imaging as indicated, coordinating care with prior notes review and current clinical encounter documentation as it pertains to the patient's presenting subjective symptoms and diagnoses. -I reviewed with the patient the imaging findings as well as clinical exam and  how it correlates to subjective symptoms.  -I had a pleasant discussion with the patient and his wife who accompanies him. I reviewed with him both that currently his clinical examination correlates to left knee chondrocalcinosis/crystalline arthropathy. I reviewed with him consideration for anti-inflammatory use. I did not advocate for the starting of long-term medication as it can exacerbate acute symptoms. I did recommend a referral to rheumatology which they wish to pursue. This is been placed. -Given the patient's functional limitation in range of motion I offered to do a therapeutic aspiration of the left knee intra-articular space.   Understand the risks and benefits of this he wished to proceed  --The patient was educated to the risks (infection) and benefits of the injection and understanding these risks and benefits, the patient wished to proceed and a verbal consent was obtained. If the patient verbalized the presence of diabetes or rheumatologic condition on chronic immunosuppresseants as a comorbidity upon direct questioning, an additional discussion was had detailing the potential increased risk of infection and potential increase in FSBG and to monitor FSBG and adjust medications as needed.  -After sterile prep of the left knee, a 60 cc of straw-colored fluid without gross flocculence was aspirated from the left knee intra-articular space. The patient tolerated the procedure well and started to have immediate improvement in pain/symptoms. Patient had improved range of motion up to 100 degrees knee flexion. Given the chondrocalcinosis as appreciated on plain radiographs and patient's symptoms and without subjective fevers or warmth and without any clinical findings or concern for infectious etiology, aspirate was not sent for definitive.  -Indomethacin 25 mg p.o. 3 times daily #45 prescribed. I recommended he take this with food each time for symptomatic treatment of crystalline arthropathy  -All questions answered to the patient's satisfaction and the patient expressed understanding and agreement with the above listed treatment plan  -Follow up in as needed  -Thank you for the clinical consultation and allowing me to participate in the patient's care. Electronically signed by Leidy Rice MD on 6/13/22 at 2:33 PM EDT         Leidy Rice MD       Orthopaedic Surgery-Sports Medicine        Disclaimer: This note was dictated with voice recognition software. Though review and correction are routinely performed, please contact the office/medical records for any errors requiring correction.

## 2024-07-16 ENCOUNTER — OFFICE VISIT (OUTPATIENT)
Dept: FAMILY MEDICINE CLINIC | Age: 59
End: 2024-07-16
Payer: COMMERCIAL

## 2024-07-16 VITALS
WEIGHT: 151.4 LBS | OXYGEN SATURATION: 96 % | HEIGHT: 65 IN | DIASTOLIC BLOOD PRESSURE: 80 MMHG | SYSTOLIC BLOOD PRESSURE: 124 MMHG | BODY MASS INDEX: 25.22 KG/M2 | HEART RATE: 79 BPM | RESPIRATION RATE: 16 BRPM | TEMPERATURE: 97.3 F

## 2024-07-16 DIAGNOSIS — C43.9 MALIGNANT MELANOMA, UNSPECIFIED SITE (HCC): ICD-10-CM

## 2024-07-16 DIAGNOSIS — R53.83 OTHER FATIGUE: ICD-10-CM

## 2024-07-16 DIAGNOSIS — Z80.0 FAMILY HISTORY OF MALIGNANT NEOPLASM OF GASTROINTESTINAL TRACT: ICD-10-CM

## 2024-07-16 DIAGNOSIS — I65.23 BILATERAL CAROTID ARTERY STENOSIS: ICD-10-CM

## 2024-07-16 DIAGNOSIS — E55.9 VITAMIN D DEFICIENCY: ICD-10-CM

## 2024-07-16 DIAGNOSIS — N40.0 BENIGN PROSTATIC HYPERPLASIA WITHOUT LOWER URINARY TRACT SYMPTOMS: ICD-10-CM

## 2024-07-16 DIAGNOSIS — Z12.11 SCREENING FOR COLON CANCER: ICD-10-CM

## 2024-07-16 DIAGNOSIS — K21.9 CHRONIC GERD: ICD-10-CM

## 2024-07-16 DIAGNOSIS — E34.9 TESTOSTERONE DEFICIENCY: ICD-10-CM

## 2024-07-16 DIAGNOSIS — I10 ESSENTIAL HYPERTENSION: ICD-10-CM

## 2024-07-16 DIAGNOSIS — Z00.00 ANNUAL PHYSICAL EXAM: Primary | ICD-10-CM

## 2024-07-16 DIAGNOSIS — Z00.00 ENCOUNTER FOR MEDICAL EXAMINATION TO ESTABLISH CARE: ICD-10-CM

## 2024-07-16 DIAGNOSIS — E78.2 MIXED HYPERLIPIDEMIA: ICD-10-CM

## 2024-07-16 PROBLEM — E78.5 HYPERLIPIDEMIA: Status: ACTIVE | Noted: 2020-12-14

## 2024-07-16 PROBLEM — M10.9 GOUTY ARTHROPATHY: Status: ACTIVE | Noted: 2019-11-18

## 2024-07-16 PROBLEM — R79.89 DECREASED TESTOSTERONE LEVEL: Status: ACTIVE | Noted: 2017-10-20

## 2024-07-16 PROCEDURE — 99386 PREV VISIT NEW AGE 40-64: CPT | Performed by: STUDENT IN AN ORGANIZED HEALTH CARE EDUCATION/TRAINING PROGRAM

## 2024-07-16 PROCEDURE — 3079F DIAST BP 80-89 MM HG: CPT | Performed by: STUDENT IN AN ORGANIZED HEALTH CARE EDUCATION/TRAINING PROGRAM

## 2024-07-16 PROCEDURE — 3074F SYST BP LT 130 MM HG: CPT | Performed by: STUDENT IN AN ORGANIZED HEALTH CARE EDUCATION/TRAINING PROGRAM

## 2024-07-16 RX ORDER — ERGOCALCIFEROL 1.25 MG/1
50000 CAPSULE ORAL WEEKLY
COMMUNITY

## 2024-07-16 RX ORDER — LOSARTAN POTASSIUM 50 MG/1
50 TABLET ORAL DAILY
Qty: 90 TABLET | Refills: 0 | Status: SHIPPED | OUTPATIENT
Start: 2024-07-16

## 2024-07-16 RX ORDER — METOPROLOL SUCCINATE 25 MG/1
25 TABLET, EXTENDED RELEASE ORAL DAILY
COMMUNITY
End: 2024-07-16 | Stop reason: SDUPTHER

## 2024-07-16 RX ORDER — TAMSULOSIN HYDROCHLORIDE 0.4 MG/1
0.4 CAPSULE ORAL DAILY
COMMUNITY

## 2024-07-16 RX ORDER — TADALAFIL 5 MG/1
5 TABLET ORAL PRN
COMMUNITY

## 2024-07-16 RX ORDER — METOPROLOL SUCCINATE 25 MG/1
25 TABLET, EXTENDED RELEASE ORAL DAILY
Qty: 90 TABLET | Refills: 0 | Status: SHIPPED | OUTPATIENT
Start: 2024-07-16

## 2024-07-16 RX ORDER — LOSARTAN POTASSIUM 50 MG/1
25 TABLET ORAL DAILY
Qty: 90 TABLET | Refills: 0 | Status: SHIPPED | OUTPATIENT
Start: 2024-07-16 | End: 2024-07-16

## 2024-07-16 RX ORDER — MIRABEGRON 25 MG/1
50 TABLET, FILM COATED, EXTENDED RELEASE ORAL DAILY
COMMUNITY

## 2024-07-16 RX ORDER — SUCRALFATE 1 G/1
1 TABLET ORAL 4 TIMES DAILY
COMMUNITY

## 2024-07-16 RX ORDER — ATORVASTATIN CALCIUM 40 MG/1
40 TABLET, FILM COATED ORAL DAILY
COMMUNITY

## 2024-07-16 NOTE — PROGRESS NOTES
Kandice Dawn  YOB: 1965        Date of Service:  7/16/2024    Chief Complaint:   Kandice Dawn is a 59 y.o. male who presents for    Chief Complaint   Patient presents with    New Patient     Need to advise patient that he will be referred out for testosterone treatments.     HPI:    Patient is a 59 y.o. male  has a past medical history of Carotid artery stenosis, Hyperlipidemia, Hypertension, and Primary central sleep apnea. who presents to \[facility]\"" care.  Patient was previously seen by PCP in [place].    History of blood in stool -  Due for Colonoscopy in Thursday, blood in stool. Dr Ruelas [facility] . 2 weeks no blood.  Endorses bloating and abdominal pain,   History of Melanoma on the back of the neck      Hypertension: Losartan 50 mg daily and metoprolol succinate 25 mg every other day.  Does not check blood pressure daily. Endorses lightheadedness.  History of carotid stenosis was seen by cardiologist in [place]  GERD: Omeprazole 20 mg, and pepcid. Carafate 1 g 4 times daily prescribed in the Er. Seems to help.   Testosterone deficiency: Testosterone level in December, On Testosterone 200 mg/ml, weekly .75 ml. Level 116 . Reports body aches and pain without tesosterone.    DREW - CPAP   Hyperlipidemia: Atorvastatin 40 mg daily  Vitamin D deficiency: 50,000 vitamin D 50,000 units weekly  BPH on mirabegron 50 mg daily and Cialis 5 mg daily . Does not take tamsulosin 0.4 mg daily    Gout History : Was prescribed indomethacin 25 mg capsule/      Pertinent family history: Mother with cancer, diabetes and high blood pressure.    Allergies: Penicillin     Social History  Occupation:   Lives with: wife Riddhi, 2 sons   Smoker: Never   Alcohol use: Never   Illicit Drug use: None   Diet: Sphagetti pork chops chicken , limited fruits and vegetables   Exercise: Walks all day [id number] steps   Sexual activity: yes   Last eye exam: 1 year ago [place]   Last dentist exam: 2-3

## 2024-07-16 NOTE — PATIENT INSTRUCTIONS
- Follow up with Urology for testosterone   - Decrease Meotprolol 12.5mg daily, continue losartan 50 mg daily  - Cardiology   - Labs

## 2024-08-16 ENCOUNTER — TELEPHONE (OUTPATIENT)
Dept: FAMILY MEDICINE CLINIC | Age: 59
End: 2024-08-16

## 2024-08-16 NOTE — TELEPHONE ENCOUNTER
Attempted to call patient regarding missed appointment with Dr. Palacio today. LMTCB to reschedule if needed. No show letter sent.

## 2024-08-29 NOTE — PROGRESS NOTES
accurate and complete to my knowledge.  I agree with the details independently gathered by the clinical support staff and the scribed note accurately describes my personal service to the patient.        Thank you for allowing us to participate in the care of Kandice Dawn. Please call me with any questions [phone].    Elmer Hernandez MD, [facility]   Interventional Cardiologist  [facility]  [phone] [place] Office  [phone] [place] Office  9/5/2024 9:59 AM

## 2024-09-05 ENCOUNTER — TELEPHONE (OUTPATIENT)
Dept: CARDIOLOGY CLINIC | Age: 59
End: 2024-09-05

## 2024-09-05 ENCOUNTER — OFFICE VISIT (OUTPATIENT)
Dept: CARDIOLOGY CLINIC | Age: 59
End: 2024-09-05
Payer: COMMERCIAL

## 2024-09-05 VITALS
WEIGHT: 153 LBS | HEART RATE: 73 BPM | OXYGEN SATURATION: 96 % | BODY MASS INDEX: 25.49 KG/M2 | SYSTOLIC BLOOD PRESSURE: 142 MMHG | HEIGHT: 65 IN | DIASTOLIC BLOOD PRESSURE: 74 MMHG

## 2024-09-05 DIAGNOSIS — R53.83 OTHER FATIGUE: ICD-10-CM

## 2024-09-05 DIAGNOSIS — G47.30 SLEEP APNEA, UNSPECIFIED TYPE: ICD-10-CM

## 2024-09-05 DIAGNOSIS — I65.23 BILATERAL CAROTID ARTERY STENOSIS: ICD-10-CM

## 2024-09-05 DIAGNOSIS — Z76.89 ESTABLISHING CARE WITH NEW DOCTOR, ENCOUNTER FOR: Primary | ICD-10-CM

## 2024-09-05 DIAGNOSIS — I70.90 ATHEROSCLEROSIS: Primary | ICD-10-CM

## 2024-09-05 DIAGNOSIS — I10 ESSENTIAL HYPERTENSION: ICD-10-CM

## 2024-09-05 DIAGNOSIS — I70.90 ATHEROSCLEROSIS: ICD-10-CM

## 2024-09-05 DIAGNOSIS — E78.2 MIXED HYPERLIPIDEMIA: ICD-10-CM

## 2024-09-05 DIAGNOSIS — Z79.899 MEDICATION MANAGEMENT: ICD-10-CM

## 2024-09-05 DIAGNOSIS — R00.2 PALPITATIONS: ICD-10-CM

## 2024-09-05 PROCEDURE — 3077F SYST BP >= 140 MM HG: CPT | Performed by: INTERNAL MEDICINE

## 2024-09-05 PROCEDURE — 99204 OFFICE O/P NEW MOD 45 MIN: CPT | Performed by: INTERNAL MEDICINE

## 2024-09-05 PROCEDURE — 93000 ELECTROCARDIOGRAM COMPLETE: CPT | Performed by: INTERNAL MEDICINE

## 2024-09-05 PROCEDURE — 3078F DIAST BP <80 MM HG: CPT | Performed by: INTERNAL MEDICINE

## 2024-09-05 RX ORDER — ASPIRIN 81 MG/1
81 TABLET ORAL DAILY
Qty: 90 TABLET | Refills: 0
Start: 2024-09-05

## 2024-09-05 NOTE — PATIENT INSTRUCTIONS
Plan:  Order carotid doppler  carotid disease  Order echocardiogram ~ palpitations, fatigue  Order GXT stress myoview ~~ fatigue, atherosclerosis  Order fasting lipids, CMP, CBC, TSH   Continue with CPAP for sleep apnea management recommend continue to follow with sleep clinic  Start Aspirin 81 mg daily ~ carotid disease  Order CT Calcium score ~ carotid disease, hyperlipidemia   Order cardiac event monitor 2 weeks palpitations  Follow up in 1 year or sooner if needed.     Your provider has ordered testing for further evaluation.  An order/prescription has been included in your paper work.   To schedule outpatient testing, contact Central Scheduling by calling [address] [place] [address] ([phone]).     GXT Myoview Stress Test instructions:   -Allow 3-4 hours for the entire test to be complete.  -Nothing to eat or drink after midnight prior to testing. May take prescribed medications in morning with sip of water.  -Do not drink or eat anything containing caffeine 24 hours prior to test. This includes coffee, decaffeinated coffee, chocolate, soda, or tea.     [facility] information cardiac calcium score at [facility] at [address], [place] to call [phone].

## 2024-09-05 NOTE — TELEPHONE ENCOUNTER
Monitor placed by Kaitlynn RN  Monitor company [employer]  Length of monitor 14 days  Monitor ordered by [facility]  Serial number [id number]  Kit ID [id number], [id number]  Activation successful prior to pt leaving office? Yes

## 2024-10-15 ENCOUNTER — HOSPITAL ENCOUNTER (OUTPATIENT)
Age: 59
Discharge: HOME | End: 2024-10-17
Attending: INTERNAL MEDICINE
Payer: COMMERCIAL

## 2024-10-15 ENCOUNTER — HOSPITAL ENCOUNTER (OUTPATIENT)
Dept: CT IMAGING | Age: 59
Discharge: HOME | End: 2024-10-15
Attending: INTERNAL MEDICINE
Payer: COMMERCIAL

## 2024-10-15 ENCOUNTER — TELEPHONE (OUTPATIENT)
Dept: CARDIOLOGY CLINIC | Age: 59
End: 2024-10-15

## 2024-10-15 ENCOUNTER — HOSPITAL ENCOUNTER (OUTPATIENT)
Dept: NUCLEAR MEDICINE | Age: 59
Discharge: HOME | End: 2024-10-15
Attending: INTERNAL MEDICINE
Payer: COMMERCIAL

## 2024-10-15 DIAGNOSIS — I65.23 BILATERAL CAROTID ARTERY STENOSIS: ICD-10-CM

## 2024-10-15 DIAGNOSIS — E78.2 MIXED HYPERLIPIDEMIA: ICD-10-CM

## 2024-10-15 DIAGNOSIS — R91.1 LUNG NODULE: Primary | ICD-10-CM

## 2024-10-15 DIAGNOSIS — G47.30 SLEEP APNEA, UNSPECIFIED TYPE: ICD-10-CM

## 2024-10-15 DIAGNOSIS — I70.90 ATHEROSCLEROSIS: ICD-10-CM

## 2024-10-15 DIAGNOSIS — I10 ESSENTIAL HYPERTENSION: ICD-10-CM

## 2024-10-15 LAB
NUC STRESS EJECTION FRACTION: 78 %
NUC STRESS LV EDV: 67 ML (ref 67–155)
NUC STRESS LV ESV: 15 ML (ref 22–58)
NUC STRESS LV MASS: 114 G
STRESS BASELINE DIAS BP: 73 MMHG
STRESS BASELINE HR: 66 BPM
STRESS BASELINE SYS BP: 155 MMHG
STRESS ESTIMATED WORKLOAD: 10.6 METS
STRESS EXERCISE DUR MIN: 10 MIN
STRESS EXERCISE DUR SEC: 16 SEC
STRESS PEAK DIAS BP: 76 MMHG
STRESS PEAK SYS BP: 189 MMHG
STRESS PERCENT HR ACHIEVED: 88 %
STRESS POST PEAK HR: 142 BPM
STRESS RATE PRESSURE PRODUCT: ABNORMAL BPM*MMHG
STRESS TARGET HR: 162 BPM
VAS LEFT ARM BP: 155 MMHG
VAS LEFT CCA DIST EDV: 19.7 CM/S
VAS LEFT CCA DIST PSV: 70.8 CM/S
VAS LEFT CCA MID EDV: 21.4 CM/S
VAS LEFT CCA MID PSV: 86.7 CM/S
VAS LEFT CCA PROX EDV: 21.4 CM/S
VAS LEFT CCA PROX PSV: 81.8 CM/S
VAS LEFT ECA PSV: 81.1 CM/S
VAS LEFT ICA DIST EDV: 33.5 CM/S
VAS LEFT ICA DIST PSV: 95.7 CM/S
VAS LEFT ICA MID EDV: 36.9 CM/S
VAS LEFT ICA MID PSV: 90.4 CM/S
VAS LEFT ICA PROX EDV: 28.5 CM/S
VAS LEFT ICA PROX PSV: 75.7 CM/S
VAS LEFT ICA/CCA PSV: 1.1
VAS LEFT SUBCLAVIAN PROX PSV: 176 CM/S
VAS LEFT VERTEBRAL EDV: 17.5 CM/S
VAS LEFT VERTEBRAL PSV: 52.9 CM/S
VAS RIGHT ARM BP: 154 MMHG
VAS RIGHT CCA DIST EDV: 22.1 CM/S
VAS RIGHT CCA DIST PSV: 69.8 CM/S
VAS RIGHT CCA MID EDV: 17.7 CM/S
VAS RIGHT CCA MID PSV: 65.4 CM/S
VAS RIGHT CCA PROX EDV: 15.7 CM/S
VAS RIGHT CCA PROX PSV: 69.3 CM/S
VAS RIGHT ECA PSV: 105 CM/S
VAS RIGHT ICA DIST EDV: 35.4 CM/S
VAS RIGHT ICA DIST PSV: 122 CM/S
VAS RIGHT ICA MID EDV: 42 CM/S
VAS RIGHT ICA MID PSV: 113 CM/S
VAS RIGHT ICA PROX EDV: 75.5 CM/S
VAS RIGHT ICA PROX PSV: 182 CM/S
VAS RIGHT ICA/CCA PSV: 2.78
VAS RIGHT SUBCLAVIAN PROX PSV: 127 CM/S
VAS RIGHT VERTEBRAL EDV: 15.4 CM/S
VAS RIGHT VERTEBRAL PSV: 43 CM/S

## 2024-10-15 PROCEDURE — 75571 CT HRT W/O DYE W/CA TEST: CPT

## 2024-10-15 PROCEDURE — 93880 EXTRACRANIAL BILAT STUDY: CPT | Performed by: SURGERY

## 2024-10-15 PROCEDURE — 93016 CV STRESS TEST SUPVJ ONLY: CPT | Performed by: INTERNAL MEDICINE

## 2024-10-15 PROCEDURE — 3430000000 HC RX DIAGNOSTIC RADIOPHARMACEUTICAL: Performed by: INTERNAL MEDICINE

## 2024-10-15 PROCEDURE — 78452 HT MUSCLE IMAGE SPECT MULT: CPT

## 2024-10-15 PROCEDURE — 78452 HT MUSCLE IMAGE SPECT MULT: CPT | Performed by: INTERNAL MEDICINE

## 2024-10-15 PROCEDURE — 93880 EXTRACRANIAL BILAT STUDY: CPT

## 2024-10-15 PROCEDURE — A9502 TC99M TETROFOSMIN: HCPCS | Performed by: INTERNAL MEDICINE

## 2024-10-15 PROCEDURE — 93017 CV STRESS TEST TRACING ONLY: CPT

## 2024-10-15 PROCEDURE — 93018 CV STRESS TEST I&R ONLY: CPT | Performed by: INTERNAL MEDICINE

## 2024-10-15 RX ADMIN — TETROFOSMIN 10.2 MILLICURIE: 1.38 INJECTION, POWDER, LYOPHILIZED, FOR SOLUTION INTRAVENOUS at 08:30

## 2024-10-15 RX ADMIN — TETROFOSMIN 33.1 MILLICURIE: 1.38 INJECTION, POWDER, LYOPHILIZED, FOR SOLUTION INTRAVENOUS at 10:39

## 2024-10-15 NOTE — TELEPHONE ENCOUNTER
Let pt know ct incidentally showed lung lesion, rec: f/u w/ pcp for this, lets forward report to pcp and pt will need referral to pulmonary as well for this.  If he is not feeling well, should go to ER as pneumonia is a consideration based on this scan. Thank you.

## 2024-10-15 NOTE — TELEPHONE ENCOUNTER
Mala with [facility] calling with Urgent report for CT Calcium score results please advise. If callback is needed please call [phone] for Radiologist.     IMPRESSION:  Total Agatston calcium score of 137.     Total calcium score of 137 is in the 60th percentile for the patient's age of  58 y/o .     Somewhat mass-like consolidation to partially imaged right lower lobe along  with mild superimposed tree-in-bud type opacities and non rounded  ground-glass opacities.  There also mild non-rounded ground-glass opacities  to visualized left lower lobe.  Findings are favored to reflect  infectious/inflammatory process, but clinical correlation and close follow-up  is recommended with repeat CT chest in 3 months.     The findings were sent to the [facility]iology [facility] at 9:58  am on 10/15/2024 to be communicated to a licensed caregiver.

## 2024-10-15 NOTE — TELEPHONE ENCOUNTER
Spoke with pt spouse and relayed message and they would like a call back from J nurse to discuss results further and to get referral. Call pt back [phone]. Pt had a echo scheduled on 10/24/2024.

## 2024-10-16 NOTE — TELEPHONE ENCOUNTER
Called and spoke with patient. He states he was seen in ED 2-3 weeks ago and treated for PNA. Since being discharged he has been feeling better, denies fever, cough, or SOB. Will forward to PCP  for further recommendations/ follow-up. Referred to [facility] team as instructed.

## 2024-10-17 DIAGNOSIS — I70.90 ATHEROSCLEROSIS: Primary | ICD-10-CM

## 2024-10-18 NOTE — TELEPHONE ENCOUNTER
Patient wife stated that pulm referral office can't get him in until dec. And would like another referral

## 2024-10-18 NOTE — TELEPHONE ENCOUNTER
Please call patient and advise him to schedule an appointment with pulmonology, given abnormal CT findings in the lungs.  Referral is in the chart.

## 2024-10-22 ENCOUNTER — TELEPHONE (OUTPATIENT)
Dept: PULMONOLOGY | Age: 59
End: 2024-10-22

## 2024-10-22 DIAGNOSIS — R91.1 LUNG NODULE: Primary | ICD-10-CM

## 2024-10-22 NOTE — TELEPHONE ENCOUNTER
Jatinder Carrion MD sent to Tanesha Christopher; Riddhi Hernandez LPN  You can schedule him in on 10/31/24 at 1000AM. Can you also pend a CT chest w/o con that he can do same day. Thank you.    [facility] for pt. To call

## 2024-10-25 ENCOUNTER — HOSPITAL ENCOUNTER (OUTPATIENT)
Dept: CT IMAGING | Age: 59
Discharge: HOME | End: 2024-10-25
Attending: INTERNAL MEDICINE
Payer: COMMERCIAL

## 2024-10-25 DIAGNOSIS — I70.90 ATHEROSCLEROSIS: ICD-10-CM

## 2024-10-25 PROCEDURE — 70496 CT ANGIOGRAPHY HEAD: CPT

## 2024-10-25 PROCEDURE — 6360000004 HC RX CONTRAST MEDICATION: Performed by: INTERNAL MEDICINE

## 2024-10-25 RX ORDER — IOPAMIDOL 755 MG/ML
75 INJECTION, SOLUTION INTRAVASCULAR
Status: COMPLETED | OUTPATIENT
Start: 2024-10-25 | End: 2024-10-25

## 2024-10-25 RX ADMIN — IOPAMIDOL 75 ML: 755 INJECTION, SOLUTION INTRAVENOUS at 08:20

## 2024-10-31 ENCOUNTER — OFFICE VISIT (OUTPATIENT)
Dept: PULMONOLOGY | Age: 59
End: 2024-10-31
Payer: COMMERCIAL

## 2024-10-31 ENCOUNTER — HOSPITAL ENCOUNTER (OUTPATIENT)
Dept: CARDIOLOGY | Age: 59
Discharge: HOME | End: 2024-11-02
Attending: INTERNAL MEDICINE
Payer: COMMERCIAL

## 2024-10-31 ENCOUNTER — HOSPITAL ENCOUNTER (OUTPATIENT)
Dept: CT IMAGING | Age: 59
Discharge: HOME | End: 2024-10-31
Payer: COMMERCIAL

## 2024-10-31 VITALS
BODY MASS INDEX: 25.95 KG/M2 | RESPIRATION RATE: 16 BRPM | WEIGHT: 152 LBS | DIASTOLIC BLOOD PRESSURE: 84 MMHG | OXYGEN SATURATION: 94 % | HEART RATE: 79 BPM | HEIGHT: 64 IN | TEMPERATURE: 97 F | SYSTOLIC BLOOD PRESSURE: 157 MMHG

## 2024-10-31 DIAGNOSIS — R91.1 LUNG NODULE: ICD-10-CM

## 2024-10-31 DIAGNOSIS — R00.2 PALPITATIONS: ICD-10-CM

## 2024-10-31 DIAGNOSIS — I70.90 ATHEROSCLEROSIS: ICD-10-CM

## 2024-10-31 DIAGNOSIS — R53.83 OTHER FATIGUE: ICD-10-CM

## 2024-10-31 DIAGNOSIS — R91.1 LUNG NODULE: Primary | ICD-10-CM

## 2024-10-31 LAB
ECHO AO ROOT DIAM: 3.6 CM
ECHO AO ROOT INDEX: 2.07 CM/M2
ECHO AV AREA PEAK VELOCITY: 2.5 CM2
ECHO AV AREA VTI: 2.4 CM2
ECHO AV AREA/BSA PEAK VELOCITY: 1.4 CM2/M2
ECHO AV AREA/BSA VTI: 1.4 CM2/M2
ECHO AV CUSP MM: 2 CM
ECHO AV MEAN GRADIENT: 4 MMHG
ECHO AV MEAN VELOCITY: 0.9 M/S
ECHO AV PEAK GRADIENT: 7 MMHG
ECHO AV PEAK VELOCITY: 1.3 M/S
ECHO AV VELOCITY RATIO: 0.92
ECHO AV VTI: 28.8 CM
ECHO BSA: 1.76 M2
ECHO LA AREA 2C: 18.4 CM2
ECHO LA AREA 4C: 15.1 CM2
ECHO LA MAJOR AXIS: 4.6 CM
ECHO LA MINOR AXIS: 5.5 CM
ECHO LA VOL BP: 49 ML (ref 18–58)
ECHO LA VOL MOD A2C: 50 ML (ref 18–58)
ECHO LA VOL MOD A4C: 40 ML (ref 18–58)
ECHO LA VOL/BSA BIPLANE: 28 ML/M2 (ref 16–34)
ECHO LA VOLUME INDEX MOD A2C: 29 ML/M2 (ref 16–34)
ECHO LA VOLUME INDEX MOD A4C: 23 ML/M2 (ref 16–34)
ECHO LV E' LATERAL VELOCITY: 12.9 CM/S
ECHO LV E' SEPTAL VELOCITY: 9.9 CM/S
ECHO LV EDV 3D: 120 ML
ECHO LV EDV INDEX 3D: 69 ML/M2
ECHO LV EF PHYSICIAN: 58 %
ECHO LV EJECTION FRACTION 3D: 56 %
ECHO LV ESV 3D: 53 ML
ECHO LV ESV INDEX 3D: 30 ML/M2
ECHO LV FRACTIONAL SHORTENING: 43 % (ref 28–44)
ECHO LV INTERNAL DIMENSION DIASTOLE INDEX: 2.7 CM/M2
ECHO LV INTERNAL DIMENSION DIASTOLIC: 4.7 CM (ref 4.2–5.9)
ECHO LV INTERNAL DIMENSION SYSTOLIC INDEX: 1.55 CM/M2
ECHO LV INTERNAL DIMENSION SYSTOLIC: 2.7 CM
ECHO LV ISOVOLUMETRIC RELAXATION TIME (IVRT): 79 MS
ECHO LV IVSD: 0.7 CM (ref 0.6–1)
ECHO LV MASS 2D: 103.1 G (ref 88–224)
ECHO LV MASS 3D INDEX: 80.5 G/M2
ECHO LV MASS 3D: 140 G
ECHO LV MASS INDEX 2D: 59.2 G/M2 (ref 49–115)
ECHO LV POSTERIOR WALL DIASTOLIC: 0.7 CM (ref 0.6–1)
ECHO LV RELATIVE WALL THICKNESS RATIO: 0.3
ECHO LVOT AREA: 2.8 CM2
ECHO LVOT AV VTI INDEX: 0.83
ECHO LVOT DIAM: 1.9 CM
ECHO LVOT MEAN GRADIENT: 3 MMHG
ECHO LVOT PEAK GRADIENT: 6 MMHG
ECHO LVOT PEAK VELOCITY: 1.2 M/S
ECHO LVOT STROKE VOLUME INDEX: 38.9 ML/M2
ECHO LVOT SV: 67.7 ML
ECHO LVOT VTI: 23.9 CM
ECHO MV A VELOCITY: 0.96 M/S
ECHO MV E DECELERATION TIME (DT): 190 MS
ECHO MV E VELOCITY: 0.94 M/S
ECHO MV E/A RATIO: 0.98
ECHO MV E/E' LATERAL: 7.29
ECHO MV E/E' RATIO (AVERAGED): 8.39
ECHO MV E/E' SEPTAL: 9.49
ECHO RA AREA 4C: 13 CM2
ECHO RA END SYSTOLIC VOLUME APICAL 4 CHAMBER INDEX BSA: 16 ML/M2
ECHO RA VOLUME: 28 ML
ECHO RV FREE WALL PEAK S': 11.4 CM/S
ECHO RV TAPSE: 2.9 CM (ref 1.7–?)

## 2024-10-31 PROCEDURE — 3079F DIAST BP 80-89 MM HG: CPT | Performed by: STUDENT IN AN ORGANIZED HEALTH CARE EDUCATION/TRAINING PROGRAM

## 2024-10-31 PROCEDURE — 71250 CT THORAX DX C-: CPT

## 2024-10-31 PROCEDURE — 93306 TTE W/DOPPLER COMPLETE: CPT

## 2024-10-31 PROCEDURE — 3077F SYST BP >= 140 MM HG: CPT | Performed by: STUDENT IN AN ORGANIZED HEALTH CARE EDUCATION/TRAINING PROGRAM

## 2024-10-31 PROCEDURE — 99204 OFFICE O/P NEW MOD 45 MIN: CPT | Performed by: STUDENT IN AN ORGANIZED HEALTH CARE EDUCATION/TRAINING PROGRAM

## 2024-10-31 ASSESSMENT — ENCOUNTER SYMPTOMS
VOMITING: 0
SHORTNESS OF BREATH: 0
COLOR CHANGE: 0
NAUSEA: 0
ABDOMINAL DISTENTION: 0
DIARRHEA: 0
CONSTIPATION: 0
SORE THROAT: 0
BACK PAIN: 0
EYE REDNESS: 0
EYE DISCHARGE: 0
WHEEZING: 0
COUGH: 0
EYE PAIN: 0
STRIDOR: 0
TROUBLE SWALLOWING: 0
ABDOMINAL PAIN: 0
EYE ITCHING: 0

## 2024-10-31 NOTE — PROGRESS NOTES
MA Communication:  The following orders are received by verbal communication from   Jatinder Carrion MD    Orders include:  FU 1 yr      CTWO 1 yr

## 2024-10-31 NOTE — PATIENT INSTRUCTIONS
Remember to bring a list of pulmonary medications and any CPAP or BiPAP machines to your next appointment with the office.     Please keep all of your future appointments scheduled by [facility] [place] [facility] office. Out of respect for other patients and providers, you may be asked to reschedule your appointment if you arrive later than your scheduled appointment time. Appointments cancelled less than 24hrs in advance will be considered a no show. Patients with three missed appointments within 1 year or four missed appointments within 2 years can be dismissed from the practice.     Please be aware that our physicians are required to work in the Intensive Care Unit at [facility].  Your appointment may need to be rescheduled if they are designated to work during your appointment time.      You may receive a survey regarding the care you received during your visit.  Your input is valuable to us.  We encourage you to complete and return your survey.  We hope you will choose us in the future for your healthcare needs.     Pt instructed of all future appointment dates & times, including radiology, labs, procedures & referrals. If procedures were scheduled preparation instructions provided. Instructions on future appointments with [facility] were given.      In the next few weeks, you will be receiving a survey from [facility] regarding your visit today.  We would greatly appreciate it if you would take just a few minutes to fill that out.  It is very important to us that our patients receive top notch care and our surveys help keep us accountable. However, if your experience was not a good one, we want to hear about that as well. This is a key way we can keep track of problems and strive to correct any for future visits.    Again, we appreciate your time and thank you for choosing [facility]!    Riddhi KIRK

## 2024-10-31 NOTE — PROGRESS NOTES
[facility] New Clinic Visit   [address], [place] [id number] [phone]    Chief Complaint/Referring Provider:  Patient is being seen at the request of Dr. Hernandez for a consultation for lung nodule    Presenting HPI:   Patient is a 59-year-old male with significant past medical history of hypertension that presents to [facility] pulmonary clinic for pulmonary nodule.  Patient has no respiratory complaints.  He denies any fever, chills, shortness of breath, cough, nausea, vomit, abdominal pain.  He has been seen at cardiology and had a CT calcium study which showed a right lower lobe lung nodules.    Patient is a never smoker, no illicit drug use, no alcohol use.  He works delivering potato chips, he denies any occupational exposures.  He denies any household exposures.    Past Medical History:   Diagnosis Date    Carotid artery stenosis     Hyperlipidemia     Hypertension     Primary central sleep apnea 2024       Past Surgical History:   Procedure Laterality Date    COLONOSCOPY  2017    normal    ESOPHAGEAL DILATATION N/A 05/23/2019    ESOPHAGEAL DILATION BACON performed by Maxim Chappell MD at [facility] ENDOSCOPY    HERNIA REPAIR      LASIK      2021    OTHER SURGICAL HISTORY Right     LUMP REMOVED FROM RIGHT ARMPIT  (UNKNOWN DATE)    UPPER GASTROINTESTINAL ENDOSCOPY  05/23/2019    EGD BIOPSY performed by Maxim Chappell MD at [facility] ENDOSCOPY    VASECTOMY  2019       Allergies   Allergen Reactions    Pcn [Penicillins]        Medication listwas reviewed and updated as needed in [facility]    Social History     Socioeconomic History    Marital status:      Spouse name: Not on file    Number of children: Not on file    Years of education: Not on file    Highest education level: Not on file   Occupational History    Not on file   Tobacco Use    Smoking status: Never    Smokeless tobacco: Never   Vaping Use    Vaping status: Never Used   Substance and Sexual Activity

## 2024-11-01 NOTE — RESULT ENCOUNTER NOTE
Called and spoke to pt, related results and pt v/u. Pt wife was with him and she asked about him having a \"major regurgitated valve\" since his cardiologist he also sees in [place] said he has one.

## 2024-12-04 DIAGNOSIS — I10 ESSENTIAL HYPERTENSION: ICD-10-CM

## 2024-12-04 NOTE — TELEPHONE ENCOUNTER
Future Appointments   Date Time Provider Department Center   11/6/2025  1:30 PM TESSY CT [facility]   11/6/2025  2:20 PM Jatinder Carrion MD [facility] 7/16/2024

## 2024-12-05 RX ORDER — LOSARTAN POTASSIUM 50 MG/1
50 TABLET ORAL DAILY
Qty: 90 TABLET | Refills: 0 | Status: SHIPPED | OUTPATIENT
Start: 2024-12-05

## 2024-12-05 RX ORDER — METOPROLOL SUCCINATE 25 MG/1
25 TABLET, EXTENDED RELEASE ORAL DAILY
Qty: 90 TABLET | Refills: 0 | Status: SHIPPED | OUTPATIENT
Start: 2024-12-05

## 2025-03-12 ENCOUNTER — OFFICE VISIT (OUTPATIENT)
Dept: FAMILY MEDICINE CLINIC | Age: 60
End: 2025-03-12
Payer: COMMERCIAL

## 2025-03-12 ENCOUNTER — HOSPITAL ENCOUNTER (OUTPATIENT)
Dept: GENERAL RADIOLOGY | Age: 60
Discharge: HOME | End: 2025-03-12
Payer: COMMERCIAL

## 2025-03-12 VITALS
HEART RATE: 68 BPM | SYSTOLIC BLOOD PRESSURE: 132 MMHG | TEMPERATURE: 97.8 F | WEIGHT: 155 LBS | DIASTOLIC BLOOD PRESSURE: 64 MMHG | BODY MASS INDEX: 26.61 KG/M2 | RESPIRATION RATE: 16 BRPM | OXYGEN SATURATION: 96 %

## 2025-03-12 DIAGNOSIS — Z92.29 HISTORY OF HORMONE THERAPY: ICD-10-CM

## 2025-03-12 DIAGNOSIS — I10 ESSENTIAL HYPERTENSION: ICD-10-CM

## 2025-03-12 DIAGNOSIS — K21.9 CHRONIC GERD: ICD-10-CM

## 2025-03-12 DIAGNOSIS — N40.0 BENIGN PROSTATIC HYPERPLASIA WITHOUT LOWER URINARY TRACT SYMPTOMS: ICD-10-CM

## 2025-03-12 DIAGNOSIS — M25.551 RIGHT HIP PAIN: Primary | ICD-10-CM

## 2025-03-12 DIAGNOSIS — E78.2 MIXED HYPERLIPIDEMIA: ICD-10-CM

## 2025-03-12 DIAGNOSIS — E34.9 TESTOSTERONE DEFICIENCY: ICD-10-CM

## 2025-03-12 DIAGNOSIS — M79.604 RIGHT LEG PAIN: ICD-10-CM

## 2025-03-12 DIAGNOSIS — M25.551 RIGHT HIP PAIN: ICD-10-CM

## 2025-03-12 DIAGNOSIS — Z85.828 HISTORY OF BASAL CELL CARCINOMA (BCC) OF SKIN: ICD-10-CM

## 2025-03-12 LAB — D-DIMER QUANTITATIVE: 0.34 UG/ML FEU (ref 0–0.6)

## 2025-03-12 PROCEDURE — 3075F SYST BP GE 130 - 139MM HG: CPT | Performed by: STUDENT IN AN ORGANIZED HEALTH CARE EDUCATION/TRAINING PROGRAM

## 2025-03-12 PROCEDURE — 3078F DIAST BP <80 MM HG: CPT | Performed by: STUDENT IN AN ORGANIZED HEALTH CARE EDUCATION/TRAINING PROGRAM

## 2025-03-12 PROCEDURE — 73502 X-RAY EXAM HIP UNI 2-3 VIEWS: CPT

## 2025-03-12 PROCEDURE — 99214 OFFICE O/P EST MOD 30 MIN: CPT | Performed by: STUDENT IN AN ORGANIZED HEALTH CARE EDUCATION/TRAINING PROGRAM

## 2025-03-12 PROCEDURE — 96372 THER/PROPH/DIAG INJ SC/IM: CPT | Performed by: STUDENT IN AN ORGANIZED HEALTH CARE EDUCATION/TRAINING PROGRAM

## 2025-03-12 RX ORDER — KETOROLAC TROMETHAMINE 30 MG/ML
30 INJECTION, SOLUTION INTRAMUSCULAR; INTRAVENOUS ONCE
Status: COMPLETED | OUTPATIENT
Start: 2025-03-12 | End: 2025-03-12

## 2025-03-12 RX ORDER — MIRABEGRON 50 MG/1
TABLET, FILM COATED, EXTENDED RELEASE ORAL
COMMUNITY
Start: 2025-03-10

## 2025-03-12 RX ORDER — TESTOSTERONE UNDECANOATE 150 MG/1
CAPSULE, LIQUID FILLED ORAL
COMMUNITY
Start: 2025-03-04

## 2025-03-12 RX ADMIN — KETOROLAC TROMETHAMINE 30 MG: 30 INJECTION, SOLUTION INTRAMUSCULAR; INTRAVENOUS at 10:44

## 2025-03-12 SDOH — ECONOMIC STABILITY: FOOD INSECURITY: WITHIN THE PAST 12 MONTHS, YOU WORRIED THAT YOUR FOOD WOULD RUN OUT BEFORE YOU GOT MONEY TO BUY MORE.: NEVER TRUE

## 2025-03-12 SDOH — ECONOMIC STABILITY: FOOD INSECURITY: WITHIN THE PAST 12 MONTHS, THE FOOD YOU BOUGHT JUST DIDN'T LAST AND YOU DIDN'T HAVE MONEY TO GET MORE.: NEVER TRUE

## 2025-03-12 ASSESSMENT — PATIENT HEALTH QUESTIONNAIRE - PHQ9
SUM OF ALL RESPONSES TO PHQ QUESTIONS 1-9: 0
1. LITTLE INTEREST OR PLEASURE IN DOING THINGS: NOT AT ALL
SUM OF ALL RESPONSES TO PHQ QUESTIONS 1-9: 0
SUM OF ALL RESPONSES TO PHQ QUESTIONS 1-9: 0
2. FEELING DOWN, DEPRESSED OR HOPELESS: NOT AT ALL
SUM OF ALL RESPONSES TO PHQ QUESTIONS 1-9: 0

## 2025-03-12 ASSESSMENT — ENCOUNTER SYMPTOMS
ABDOMINAL PAIN: 1
WHEEZING: 0
VOMITING: 0
BACK PAIN: 0
CONSTIPATION: 0
SHORTNESS OF BREATH: 0
NAUSEA: 0
TROUBLE SWALLOWING: 1
DIARRHEA: 0
BLOOD IN STOOL: 1
COUGH: 0

## 2025-03-12 NOTE — PATIENT INSTRUCTIONS
Trial Toradol shot -  Tylenol as needed today   Medrol dose pack start tomorrow   Refer to a physical therapist   Heat and Ice Therapy: alternate between heat and ice application to reduce pain and inflammation.  Lifestyle and Activity Modifications:  Advise the patient to avoid prolonged sitting or positions that exacerbate symptoms.  Encourage regular breaks to stand and stretch, especia

## 2025-03-12 NOTE — PROGRESS NOTES
Kandice Dawn  YOB: 1965        Date of Service:  3/12/2025    Chief Complaint:   Kandice Dawn is a 59 y.o. male who presents for    Chief Complaint   Patient presents with    Leg Pain     Upper part of leg   Started about a week ago   Got worse starting Monday        HPI:    Patient is a 59 y.o. male  has a past medical history of basal cell carcinoma, gout, BPH, vitamin D deficiency, testosterone replacement therapy, carotid artery stenosis, Hyperlipidemia, Hypertension, and Primary central sleep apnea. who presents with right thigh pain.           Leg Pain   Pain in right groin down to knee. Incident onset: Last week, The incident occurred at home. There was no injury mechanism. The pain is present in the right thigh and right hip (Right hip, groin and down to knee (anterior leg)). The quality of the pain is described as cramping (\"muscles and joint twisiting\"). The pain is at a severity of 9/10. The pain is severe. The pain has been Constant since onset. Associated symptoms include a loss of motion. Pertinent negatives include no inability to bear weight, loss of sensation, muscle weakness, numbness or tingling.  No color changes. . The symptoms are aggravated by movement and weight bearing. Treatments tried: Tia oil, Aleve - Has not tried tylneol. The treatment provided no relief.   Reports pain that travel, pain in shoulder. Last week it moved to the right hip. Moves from shoulder and natasha. Saw Chiropractor      History of basal cell carcinoma: Seen by [facility]  Patient had repeat colonoscopy.   Hypertension: Currently taking losartan 50 mg daily and metoprolol succinate 25 mg every other day.  No chest pain, shortness of breath, palpitations     Testosterone deficiency:On KYZATREX Testosterone .  Reports body aches and pain without tesosterone.   , ranout 1.5 weeks ago, last Friday   DREW - CPAP   Hyperlipidemia: Atorvastatin 40 mg daily  Vitamin D deficiency:  vitamin D 50,000

## 2025-03-13 ENCOUNTER — TELEPHONE (OUTPATIENT)
Dept: FAMILY MEDICINE CLINIC | Age: 60
End: 2025-03-13

## 2025-03-13 ENCOUNTER — RESULTS FOLLOW-UP (OUTPATIENT)
Dept: GENERAL RADIOLOGY | Age: 60
End: 2025-03-13

## 2025-03-13 ENCOUNTER — RESULTS FOLLOW-UP (OUTPATIENT)
Dept: FAMILY MEDICINE CLINIC | Age: 60
End: 2025-03-13

## 2025-03-13 RX ORDER — METHYLPREDNISOLONE 4 MG/1
TABLET ORAL
Qty: 1 KIT | Refills: 0 | Status: SHIPPED | OUTPATIENT
Start: 2025-03-13 | End: 2025-03-19

## 2025-03-13 NOTE — TELEPHONE ENCOUNTER
3/12/2025   Patient said dr rabago was going to send a zpac to [facility][id number] - [place], [place] - [address] -  [phone] -  [phone] [[id number]]   Z pac was for his inflammation on his right leg

## 2025-04-01 DIAGNOSIS — I10 ESSENTIAL HYPERTENSION: ICD-10-CM

## 2025-04-01 NOTE — TELEPHONE ENCOUNTER
Future Appointments   Date Time Provider Department Center   11/6/2025  1:30 PM TESSY CT [facility]   11/6/2025  2:20 PM Jatinder Carrion MD [facility] 3/12/2025

## 2025-04-02 RX ORDER — METOPROLOL SUCCINATE 25 MG/1
25 TABLET, EXTENDED RELEASE ORAL DAILY
Qty: 90 TABLET | Refills: 1 | Status: SHIPPED | OUTPATIENT
Start: 2025-04-02

## 2025-05-05 RX ORDER — ATORVASTATIN CALCIUM 40 MG/1
40 TABLET, FILM COATED ORAL DAILY
Qty: 90 TABLET | Refills: 1 | Status: SHIPPED | OUTPATIENT
Start: 2025-05-05

## 2025-07-18 DIAGNOSIS — I10 ESSENTIAL HYPERTENSION: ICD-10-CM

## 2025-07-22 RX ORDER — LOSARTAN POTASSIUM 50 MG/1
25 TABLET ORAL DAILY
Qty: 45 TABLET | Refills: 0 | Status: SHIPPED | OUTPATIENT
Start: 2025-07-22

## (undated) DEVICE — ENDO CARRY-ON PROCEDURE KIT INCLUDES SUCTION TUBING, LUBRICANT, GAUZE, BIOHAZARD STICKER, TRANSPORT PAD AND INTERCEPT BEDSIDE KIT.: Brand: ENDO CARRY-ON PROCEDURE KIT

## (undated) DEVICE — FORCEP REPROC BIOP HOT 2.8MM MIN WORK CHANL RADL JAW 4

## (undated) DEVICE — CONMED SCOPE SAVER BITE BLOCK, 20X27 MM: Brand: SCOPE SAVER

## (undated) DEVICE — ELECTRODE ECG MONITR FOAM TEAR DROP ADLT RED